# Patient Record
Sex: MALE | Race: WHITE | NOT HISPANIC OR LATINO | Employment: FULL TIME | URBAN - METROPOLITAN AREA
[De-identification: names, ages, dates, MRNs, and addresses within clinical notes are randomized per-mention and may not be internally consistent; named-entity substitution may affect disease eponyms.]

---

## 2018-07-03 ENCOUNTER — HOSPITAL ENCOUNTER (EMERGENCY)
Facility: HOSPITAL | Age: 23
Discharge: HOME | End: 2018-07-03
Attending: EMERGENCY MEDICINE

## 2018-07-03 VITALS
TEMPERATURE: 98 F | OXYGEN SATURATION: 99 % | BODY MASS INDEX: 31.19 KG/M2 | DIASTOLIC BLOOD PRESSURE: 66 MMHG | WEIGHT: 230 LBS | HEART RATE: 89 BPM | SYSTOLIC BLOOD PRESSURE: 139 MMHG | RESPIRATION RATE: 16 BRPM

## 2018-07-03 DIAGNOSIS — S39.012A BACK STRAIN, INITIAL ENCOUNTER: Primary | ICD-10-CM
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
## 2018-07-03 DIAGNOSIS — S16.1XXA STRAIN OF NECK MUSCLE, INITIAL ENCOUNTER: ICD-10-CM

## 2018-07-03 PROCEDURE — 99283 EMERGENCY DEPT VISIT LOW MDM: CPT

## 2018-07-03 RX ORDER — TRAMADOL HYDROCHLORIDE 50 MG/1
TABLET ORAL
Qty: 8 TABLET | Refills: 0 | Status: SHIPPED | OUTPATIENT
Start: 2018-07-03 | End: 2020-01-14

## 2018-07-03 RX ORDER — NAPROXEN 500 MG/1
500 TABLET ORAL ONCE
Status: COMPLETED | OUTPATIENT
Start: 2018-07-03 | End: 2018-07-03

## 2018-07-03 RX ORDER — NAPROXEN 500 MG/1
500 TABLET ORAL 2 TIMES DAILY WITH MEALS
Qty: 10 TABLET | Refills: 0 | Status: SHIPPED | OUTPATIENT
Start: 2018-07-03 | End: 2020-01-14

## 2018-07-03 RX ORDER — TRAMADOL HYDROCHLORIDE 50 MG/1
50 TABLET ORAL ONCE
Status: COMPLETED | OUTPATIENT
Start: 2018-07-03 | End: 2018-07-03

## 2018-07-03 RX ORDER — CYCLOBENZAPRINE HCL 10 MG
10 TABLET ORAL ONCE
Status: COMPLETED | OUTPATIENT
Start: 2018-07-03 | End: 2018-07-03

## 2018-07-03 RX ORDER — CYCLOBENZAPRINE HCL 10 MG
10 TABLET ORAL 2 TIMES DAILY PRN
Qty: 10 TABLET | Refills: 0 | Status: SHIPPED | OUTPATIENT
Start: 2018-07-03 | End: 2020-01-14

## 2018-07-03 RX ADMIN — NAPROXEN 500 MG: 500 TABLET ORAL at 23:32

## 2018-07-03 RX ADMIN — TRAMADOL HYDROCHLORIDE 50 MG: 50 TABLET, FILM COATED ORAL at 23:32

## 2018-07-03 RX ADMIN — CYCLOBENZAPRINE HYDROCHLORIDE 10 MG: 10 TABLET, FILM COATED ORAL at 23:32
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
## 2018-07-04 NOTE — ED NOTES
Visitor from pt's room stepped out to ask when pt would be seen, explained to pt that he is next to be seen at this point        Priya Avendano RN  07/03/18 8162
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
## 2020-01-14 ENCOUNTER — APPOINTMENT (EMERGENCY)
Dept: RADIOLOGY | Facility: HOSPITAL | Age: 25
End: 2020-01-14
Payer: COMMERCIAL

## 2020-01-14 ENCOUNTER — HOSPITAL ENCOUNTER (EMERGENCY)
Facility: HOSPITAL | Age: 25
Discharge: HOME | End: 2020-01-14
Attending: EMERGENCY MEDICINE | Admitting: EMERGENCY MEDICINE
Payer: COMMERCIAL

## 2020-01-14 VITALS
SYSTOLIC BLOOD PRESSURE: 100 MMHG | RESPIRATION RATE: 18 BRPM | HEART RATE: 80 BPM | DIASTOLIC BLOOD PRESSURE: 44 MMHG | OXYGEN SATURATION: 94 % | BODY MASS INDEX: 31.6 KG/M2 | TEMPERATURE: 100.2 F | WEIGHT: 233 LBS

## 2020-01-14 DIAGNOSIS — J10.1 INFLUENZA B: Primary | ICD-10-CM

## 2020-01-14 LAB
ALBUMIN SERPL BCP-MCNC: 3.7 G/DL (ref 3.5–5)
ALP SERPL-CCNC: 61 U/L (ref 46–116)
ALT SERPL W P-5'-P-CCNC: 53 U/L (ref 12–78)
ANION GAP SERPL CALCULATED.3IONS-SCNC: 9 MMOL/L (ref 4–13)
AST SERPL W P-5'-P-CCNC: 24 U/L (ref 5–45)
BASOPHILS # BLD AUTO: 0.03 THOUSANDS/ΜL (ref 0–0.1)
BASOPHILS NFR BLD AUTO: 1 % (ref 0–1)
BILIRUB SERPL-MCNC: 0.5 MG/DL (ref 0.2–1)
BUN SERPL-MCNC: 12 MG/DL (ref 5–25)
CALCIUM SERPL-MCNC: 8.5 MG/DL (ref 8.3–10.1)
CHLORIDE SERPL-SCNC: 102 MMOL/L (ref 100–108)
CO2 SERPL-SCNC: 24 MMOL/L (ref 21–32)
CREAT SERPL-MCNC: 1.26 MG/DL (ref 0.6–1.3)
EOSINOPHIL # BLD AUTO: 0.19 THOUSAND/ΜL (ref 0–0.61)
EOSINOPHIL NFR BLD AUTO: 3 % (ref 0–6)
ERYTHROCYTE [DISTWIDTH] IN BLOOD BY AUTOMATED COUNT: 12.5 % (ref 11.6–15.1)
FLUAV RNA NPH QL NAA+PROBE: ABNORMAL
FLUBV RNA NPH QL NAA+PROBE: DETECTED
GFR SERPL CREATININE-BSD FRML MDRD: 79 ML/MIN/1.73SQ M
GLUCOSE SERPL-MCNC: 99 MG/DL (ref 65–140)
HCT VFR BLD AUTO: 45.2 % (ref 36.5–49.3)
HGB BLD-MCNC: 15.2 G/DL (ref 12–17)
IMM GRANULOCYTES # BLD AUTO: 0.01 THOUSAND/UL (ref 0–0.2)
IMM GRANULOCYTES NFR BLD AUTO: 0 % (ref 0–2)
LYMPHOCYTES # BLD AUTO: 2.06 THOUSANDS/ΜL (ref 0.6–4.47)
LYMPHOCYTES NFR BLD AUTO: 36 % (ref 14–44)
MCH RBC QN AUTO: 28.7 PG (ref 26.8–34.3)
MCHC RBC AUTO-ENTMCNC: 33.6 G/DL (ref 31.4–37.4)
MCV RBC AUTO: 85 FL (ref 82–98)
MONOCYTES # BLD AUTO: 0.93 THOUSAND/ΜL (ref 0.17–1.22)
MONOCYTES NFR BLD AUTO: 16 % (ref 4–12)
NEUTROPHILS # BLD AUTO: 2.58 THOUSANDS/ΜL (ref 1.85–7.62)
NEUTS SEG NFR BLD AUTO: 44 % (ref 43–75)
NRBC BLD AUTO-RTO: 0 /100 WBCS
PLATELET # BLD AUTO: 207 THOUSANDS/UL (ref 149–390)
PMV BLD AUTO: 9.8 FL (ref 8.9–12.7)
POTASSIUM SERPL-SCNC: 3.3 MMOL/L (ref 3.5–5.3)
PROT SERPL-MCNC: 7.2 G/DL (ref 6.4–8.2)
RBC # BLD AUTO: 5.29 MILLION/UL (ref 3.88–5.62)
RSV RNA NPH QL NAA+PROBE: ABNORMAL
SODIUM SERPL-SCNC: 135 MMOL/L (ref 136–145)
WBC # BLD AUTO: 5.8 THOUSAND/UL (ref 4.31–10.16)

## 2020-01-14 PROCEDURE — 99284 EMERGENCY DEPT VISIT MOD MDM: CPT | Performed by: EMERGENCY MEDICINE

## 2020-01-14 PROCEDURE — 96361 HYDRATE IV INFUSION ADD-ON: CPT

## 2020-01-14 PROCEDURE — 36415 COLL VENOUS BLD VENIPUNCTURE: CPT | Performed by: EMERGENCY MEDICINE

## 2020-01-14 PROCEDURE — 87631 RESP VIRUS 3-5 TARGETS: CPT | Performed by: EMERGENCY MEDICINE

## 2020-01-14 PROCEDURE — 94640 AIRWAY INHALATION TREATMENT: CPT

## 2020-01-14 PROCEDURE — 96374 THER/PROPH/DIAG INJ IV PUSH: CPT

## 2020-01-14 PROCEDURE — 71045 X-RAY EXAM CHEST 1 VIEW: CPT

## 2020-01-14 PROCEDURE — 99284 EMERGENCY DEPT VISIT MOD MDM: CPT

## 2020-01-14 PROCEDURE — 80053 COMPREHEN METABOLIC PANEL: CPT | Performed by: EMERGENCY MEDICINE

## 2020-01-14 PROCEDURE — 85025 COMPLETE CBC W/AUTO DIFF WBC: CPT | Performed by: EMERGENCY MEDICINE

## 2020-01-14 RX ORDER — AZITHROMYCIN 250 MG/1
500 TABLET, FILM COATED ORAL EVERY 24 HOURS
COMMUNITY

## 2020-01-14 RX ORDER — IPRATROPIUM BROMIDE AND ALBUTEROL SULFATE 2.5; .5 MG/3ML; MG/3ML
3 SOLUTION RESPIRATORY (INHALATION) ONCE
Status: COMPLETED | OUTPATIENT
Start: 2020-01-14 | End: 2020-01-14

## 2020-01-14 RX ORDER — GUAIFENESIN/DEXTROMETHORPHAN 100-10MG/5
10 SYRUP ORAL ONCE
Status: COMPLETED | OUTPATIENT
Start: 2020-01-14 | End: 2020-01-14

## 2020-01-14 RX ORDER — OSELTAMIVIR PHOSPHATE 75 MG/1
75 CAPSULE ORAL EVERY 12 HOURS SCHEDULED
COMMUNITY

## 2020-01-14 RX ORDER — ACETAMINOPHEN 325 MG/1
650 TABLET ORAL ONCE
Status: DISCONTINUED | OUTPATIENT
Start: 2020-01-14 | End: 2020-01-15 | Stop reason: HOSPADM

## 2020-01-14 RX ORDER — KETOROLAC TROMETHAMINE 30 MG/ML
30 INJECTION, SOLUTION INTRAMUSCULAR; INTRAVENOUS ONCE
Status: COMPLETED | OUTPATIENT
Start: 2020-01-14 | End: 2020-01-14

## 2020-01-14 RX ADMIN — SODIUM CHLORIDE 1000 ML: 0.9 INJECTION, SOLUTION INTRAVENOUS at 22:10

## 2020-01-14 RX ADMIN — IPRATROPIUM BROMIDE AND ALBUTEROL SULFATE 3 ML: 2.5; .5 SOLUTION RESPIRATORY (INHALATION) at 22:19

## 2020-01-14 RX ADMIN — GUAIFENESIN AND DEXTROMETHORPHAN 10 ML: 100; 10 SYRUP ORAL at 22:17

## 2020-01-14 RX ADMIN — KETOROLAC TROMETHAMINE 30 MG: 30 INJECTION, SOLUTION INTRAMUSCULAR at 22:17

## 2020-01-15 NOTE — ED PROVIDER NOTES
History  Chief Complaint   Patient presents with    Fever - 9 weeks to 74 years     States starte on 1/12 with fever , cough, shortness of breath, diarrhea and vomiting  Patient states he cant move due to body pain, having chest pain, headache  Seen at [facility] yesterday and flu was negative and had CXR done  osteltamivir and zpack     26 yo male c/o feeling sick for 2-3 days  C/o cough, vomiting, diarrhea, body aches, headache and chest pain  Feels sob  Feels feverish but did not take temp at home  Is not using any tylenol or advil or otc cough medicine  Seen at [facility] and reportedly had negative chest xray and flu swab and was put on Z-carla and tamiflu  Pt  Says he is not better and is getting worse  Says he can't sleep  History provided by:  Patient and significant other   used: No        Prior to Admission Medications   Prescriptions Last Dose Informant Patient Reported? Taking? azithromycin (ZITHROMAX) 250 mg tablet  Self Yes Yes   Sig: Take 500 mg by mouth every 24 hours   oseltamivir (TAMIFLU) 75 mg capsule 1/14/2020 at Unknown time  Yes Yes   Sig: Take 75 mg by mouth every 12 (twelve) hours      Facility-Administered Medications: None       History reviewed  No pertinent past medical history  Past Surgical History:   Procedure Laterality Date    FOOT SURGERY Right        History reviewed  No pertinent family history  I have reviewed and agree with the history as documented  Social History     Tobacco Use    Smoking status: Current Every Day Smoker     Types: E-Cigarettes    Smokeless tobacco: Never Used    Tobacco comment: smokes cigarettes and isma   Substance Use Topics    Alcohol use: No    Drug use: No        Review of Systems   Constitutional: Positive for chills and fever  HENT: Positive for congestion  Negative for sore throat  Eyes: Negative  Respiratory: Positive for cough and shortness of breath      Cardiovascular: Positive for chest pain  Negative for leg swelling  Gastrointestinal: Positive for diarrhea and vomiting  Negative for abdominal pain and nausea  Genitourinary: Negative  Negative for dysuria, flank pain and hematuria  Musculoskeletal: Positive for myalgias  Negative for back pain  Skin: Negative  Negative for rash and wound  Neurological: Positive for dizziness, weakness and headaches  Psychiatric/Behavioral: Positive for sleep disturbance  Negative for confusion and hallucinations  The patient is not nervous/anxious  All other systems reviewed and are negative  Physical Exam  Physical Exam   Constitutional: He is oriented to person, place, and time  He appears well-developed and well-nourished  No distress  Temp 100 2   HENT:   Head: Normocephalic and atraumatic  Right Ear: Tympanic membrane normal    Left Ear: Tympanic membrane normal    Nose: Nose normal    Mouth/Throat: Mucous membranes are normal  Posterior oropharyngeal erythema present  No oropharyngeal exudate or posterior oropharyngeal edema  Eyes: Conjunctivae are normal  No scleral icterus  Neck: Normal range of motion  Neck supple  Cardiovascular: Normal rate, regular rhythm and normal heart sounds  No murmur heard  Pulmonary/Chest: Effort normal and breath sounds normal  No respiratory distress  He has no wheezes  He exhibits no tenderness  Abdominal: Soft  Bowel sounds are normal  He exhibits no distension  There is no tenderness  Musculoskeletal: Normal range of motion  He exhibits no edema, tenderness or deformity  Neurological: He is alert and oriented to person, place, and time  No cranial nerve deficit  Skin: Skin is warm and dry  No rash noted  He is not diaphoretic  No erythema  No pallor  Psychiatric: He has a normal mood and affect  His behavior is normal    Nursing note and vitals reviewed        Vital Signs  ED Triage Vitals [01/14/20 2150]   Temperature Pulse Respirations Blood Pressure SpO2   100 2 °F (37 9 °C) 76 (!) 24 133/68 95 %      Temp Source Heart Rate Source Patient Position - Orthostatic VS BP Location FiO2 (%)   Tympanic Monitor Lying Right arm --      Pain Score       Worst Possible Pain           Vitals:    01/14/20 2150 01/14/20 2215 01/14/20 2230   BP: 133/68     Pulse: 76 70 62   Patient Position - Orthostatic VS: Lying           Visual Acuity      ED Medications  Medications   sodium chloride 0 9 % bolus 1,000 mL (1,000 mL Intravenous New Bag 1/14/20 2210)   acetaminophen (TYLENOL) tablet 650 mg (650 mg Oral Not Given 1/14/20 2216)   ketorolac (TORADOL) injection 30 mg (30 mg Intravenous Given 1/14/20 2217)   dextromethorphan-guaiFENesin (ROBITUSSIN DM)  mg/5 mL oral syrup 10 mL (10 mL Oral Given 1/14/20 2217)   ipratropium-albuterol (DUO-NEB) 0 5-2 5 mg/3 mL inhalation solution 3 mL (3 mL Nebulization Given 1/14/20 2219)       Diagnostic Studies  Results Reviewed     Procedure Component Value Units Date/Time    Influenza A/B and RSV PCR [[id number]]  (Abnormal) Collected:  01/14/20 2210    Lab Status:  Final result Specimen:  Nose Updated:  01/14/20 2251     INFLUENZA A PCR None Detected     INFLUENZA B PCR Detected     RSV PCR None Detected    Comprehensive metabolic panel [[id number]]  (Abnormal) Collected:  01/14/20 2210    Lab Status:  Final result Specimen:  Blood from Arm, Left Updated:  01/14/20 2230     Sodium 135 mmol/L      Potassium 3 3 mmol/L      Chloride 102 mmol/L      CO2 24 mmol/L      ANION GAP 9 mmol/L      BUN 12 mg/dL      Creatinine 1 26 mg/dL      Glucose 99 mg/dL      Calcium 8 5 mg/dL      AST 24 U/L      ALT 53 U/L      Alkaline Phosphatase 61 U/L      Total Protein 7 2 g/dL      Albumin 3 7 g/dL      Total Bilirubin 0 50 mg/dL      eGFR 79 ml/min/1 73sq m     Narrative:       Ame guidelines for Chronic Kidney Disease (CKD):     Stage 1 with normal or high GFR (GFR > 90 mL/min/1 73 square meters)    Stage 2 Mild CKD (GFR = 60-89 mL/min/1 73 square meters)    Stage 3A Moderate CKD (GFR = 45-59 mL/min/1 73 square meters)    Stage 3B Moderate CKD (GFR = 30-44 mL/min/1 73 square meters)    Stage 4 Severe CKD (GFR = 15-29 mL/min/1 73 square meters)    Stage 5 End Stage CKD (GFR <15 mL/min/1 73 square meters)  Note: GFR calculation is accurate only with a steady state creatinine    CBC and differential [[id number]]  (Abnormal) Collected:  01/14/20 2210    Lab Status:  Final result Specimen:  Blood from Arm, Left Updated:  01/14/20 2215     WBC 5 80 Thousand/uL      RBC 5 29 Million/uL      Hemoglobin 15 2 g/dL      Hematocrit 45 2 %      MCV 85 fL      MCH 28 7 pg      MCHC 33 6 g/dL      RDW 12 5 %      MPV 9 8 fL      Platelets 873 Thousands/uL      nRBC 0 /100 WBCs      Neutrophils Relative 44 %      Immat GRANS % 0 %      Lymphocytes Relative 36 %      Monocytes Relative 16 %      Eosinophils Relative 3 %      Basophils Relative 1 %      Neutrophils Absolute 2 58 Thousands/µL      Immature Grans Absolute 0 01 Thousand/uL      Lymphocytes Absolute 2 06 Thousands/µL      Monocytes Absolute 0 93 Thousand/µL      Eosinophils Absolute 0 19 Thousand/µL      Basophils Absolute 0 03 Thousands/µL                  XR chest 1 view portable   ED Interpretation by Alejandro Rizo MD (44/49 2040)   NAD                 Procedures  Procedures         ED Course                               MDM  Number of Diagnoses or Management Options  Diagnosis management comments: Evaluation confirmed + influenza B  Pt  Has been already been started on Tamiflu and Z-carla  I advised he can stop the Z-carla if he wants  Pt  Advised it will take time for the infection to get better and he needs to rest, drink extra fluids, treat symptoms prn with Tylenol/advil/otc cough medicine          Disposition  Final diagnoses:   Influenza B     Time reflects when diagnosis was documented in both MDM as applicable and the Disposition within this note     Time User Action Codes Description Comment 8/02/5632 81:65 PM Adi Lugo [X92 1] Influenza B       ED Disposition     ED Disposition Condition Date/Time Comment    Discharge Stable Tue Jan 14, 2020 10:52 PM Yadira Saenz discharge to home/self care  Follow-up Information     Follow up With Specialties Details Why Contact Info    your doctor   As needed           Patient's Medications   Discharge Prescriptions    No medications on file     No discharge procedures on file      ED Provider  Electronically Signed by           Chasity Bautista MD  11/68/32 0582

## 2020-01-15 NOTE — ED NOTES
Patient was afraid that flu swab would give him a nose bleed  Immediately after IV, patient needed a pillow under his arm , IV is patent and infusing without problem  PCXR done  WBC normal  Patient had 1 gm Tylenol 2 hours PTA  Neb in progress  Patient's nurse Soto Nunez aware        Winnie Thompson RN  01/14/20 3276

## 2020-01-15 NOTE — DISCHARGE INSTRUCTIONS
You can stop the Zithromax but finish the Tamiflu  This is a viral infection that will run its course on its own in about a week  You need to give this more time  Rest, drink extra fluids, use tylenol and advil for pain and fever  Use an over the counter cough and cold medicine as needed

## 2025-03-18 ENCOUNTER — APPOINTMENT (OUTPATIENT)
Dept: URGENT CARE | Facility: MEDICAL CENTER | Age: 30
End: 2025-03-18